# Patient Record
Sex: FEMALE | Race: WHITE | Employment: FULL TIME | URBAN - NONMETROPOLITAN AREA
[De-identification: names, ages, dates, MRNs, and addresses within clinical notes are randomized per-mention and may not be internally consistent; named-entity substitution may affect disease eponyms.]

---

## 2017-12-27 ENCOUNTER — HOSPITAL ENCOUNTER (OUTPATIENT)
Dept: MAMMOGRAPHY | Age: 42
Discharge: HOME | End: 2017-12-27
Attending: OBSTETRICS & GYNECOLOGY | Admitting: OBSTETRICS & GYNECOLOGY

## 2017-12-27 DIAGNOSIS — Z12.31 VISIT FOR SCREENING MAMMOGRAM: ICD-10-CM

## 2019-01-25 ENCOUNTER — HOSPITAL ENCOUNTER (OUTPATIENT)
Dept: WOMENS IMAGING | Age: 44
Discharge: HOME | End: 2019-01-25
Payer: COMMERCIAL

## 2019-01-25 DIAGNOSIS — Z12.31 ENCOUNTER FOR SCREENING MAMMOGRAM FOR BREAST CANCER: ICD-10-CM

## 2019-01-25 PROCEDURE — 77063 BREAST TOMOSYNTHESIS BI: CPT

## 2020-06-29 ENCOUNTER — HOSPITAL ENCOUNTER (OUTPATIENT)
Dept: MAMMOGRAPHY | Age: 45
Discharge: HOME | End: 2020-06-29
Payer: COMMERCIAL

## 2020-06-29 PROCEDURE — 77063 BREAST TOMOSYNTHESIS BI: CPT

## 2020-07-02 ENCOUNTER — HOSPITAL ENCOUNTER (OUTPATIENT)
Dept: MAMMOGRAPHY | Age: 45
Discharge: HOME | End: 2020-07-02
Payer: COMMERCIAL

## 2020-07-02 ENCOUNTER — HOSPITAL ENCOUNTER (OUTPATIENT)
Dept: ULTRASOUND IMAGING | Age: 45
Discharge: HOME | End: 2020-07-02
Payer: COMMERCIAL

## 2020-07-02 PROCEDURE — 77065 DX MAMMO INCL CAD UNI: CPT

## 2021-11-30 ENCOUNTER — OFFICE VISIT (OUTPATIENT)
Dept: OBGYN | Age: 46
End: 2021-11-30
Payer: COMMERCIAL

## 2021-11-30 ENCOUNTER — HOSPITAL ENCOUNTER (OUTPATIENT)
Age: 46
Setting detail: SPECIMEN
Discharge: HOME | End: 2021-11-30
Payer: COMMERCIAL

## 2021-11-30 VITALS
HEIGHT: 66 IN | SYSTOLIC BLOOD PRESSURE: 124 MMHG | BODY MASS INDEX: 32.14 KG/M2 | WEIGHT: 200 LBS | DIASTOLIC BLOOD PRESSURE: 81 MMHG

## 2021-11-30 DIAGNOSIS — Z01.419 WOMEN'S ANNUAL ROUTINE GYNECOLOGICAL EXAMINATION: Primary | ICD-10-CM

## 2021-11-30 PROCEDURE — 99396 PREV VISIT EST AGE 40-64: CPT | Performed by: NURSE PRACTITIONER

## 2021-11-30 PROCEDURE — 88142 CYTOPATH C/V THIN LAYER: CPT

## 2021-11-30 PROCEDURE — 87624 HPV HI-RISK TYP POOLED RSLT: CPT

## 2021-11-30 RX ORDER — LEVOTHYROXINE SODIUM 137 UG/1
137 TABLET ORAL DAILY
COMMUNITY

## 2021-11-30 SDOH — ECONOMIC STABILITY: FOOD INSECURITY: WITHIN THE PAST 12 MONTHS, YOU WORRIED THAT YOUR FOOD WOULD RUN OUT BEFORE YOU GOT MONEY TO BUY MORE.: NEVER TRUE

## 2021-11-30 SDOH — ECONOMIC STABILITY: FOOD INSECURITY: WITHIN THE PAST 12 MONTHS, THE FOOD YOU BOUGHT JUST DIDN'T LAST AND YOU DIDN'T HAVE MONEY TO GET MORE.: NEVER TRUE

## 2021-11-30 ASSESSMENT — SOCIAL DETERMINANTS OF HEALTH (SDOH): HOW HARD IS IT FOR YOU TO PAY FOR THE VERY BASICS LIKE FOOD, HOUSING, MEDICAL CARE, AND HEATING?: NOT HARD AT ALL

## 2021-11-30 ASSESSMENT — ENCOUNTER SYMPTOMS
RESPIRATORY NEGATIVE: 1
GASTROINTESTINAL NEGATIVE: 1

## 2021-11-30 ASSESSMENT — PATIENT HEALTH QUESTIONNAIRE - PHQ9
SUM OF ALL RESPONSES TO PHQ9 QUESTIONS 1 & 2: 0
SUM OF ALL RESPONSES TO PHQ QUESTIONS 1-9: 0
SUM OF ALL RESPONSES TO PHQ QUESTIONS 1-9: 0
2. FEELING DOWN, DEPRESSED OR HOPELESS: 0
1. LITTLE INTEREST OR PLEASURE IN DOING THINGS: 0
SUM OF ALL RESPONSES TO PHQ QUESTIONS 1-9: 0

## 2021-11-30 NOTE — PROGRESS NOTES
21    Catarino Alba  1975    Chief Complaint   Patient presents with    Gynecologic Exam     pt last pap unknown,  last LMP 11/10/21 pt has never had mammo        The patient is a 39 y.o. female, Garrett Kay who presents for her annual exam.  She is menstruating normally. She is  sexually active. She is not currently taking birth control. She reports no gynecological symptoms. Pap smear history: Her last PAP smear was in unknown. Her results were normal.    Breast history: her most recent mammogram was in . The results were: Normal    Past Medical History:   Diagnosis Date    Allergies     Depression     Thyroid disease        Past Surgical History:   Procedure Laterality Date     SECTION         History reviewed. No pertinent family history. Social History     Tobacco Use    Smoking status: Current Every Day Smoker     Types: Cigarettes    Smokeless tobacco: Never Used   Vaping Use    Vaping Use: Never used   Substance Use Topics    Alcohol use: Yes     Alcohol/week: 1.0 standard drink     Types: 1 Glasses of wine per week    Drug use: Not on file       Current Outpatient Medications   Medication Sig Dispense Refill    levothyroxine (SYNTHROID) 137 MCG tablet Take 137 mcg by mouth Daily       No current facility-administered medications for this visit. No Known Allergies        Immunization History   Administered Date(s) Administered    COVID-19, Pfizer, PF, 30mcg/0.3mL 2021, 04/10/2021       Review of Systems   Constitutional: Negative. Respiratory: Negative. Gastrointestinal: Negative. Genitourinary: Negative. /81   Ht 5' 6\" (1.676 m)   Wt 200 lb (90.7 kg)   LMP 11/10/2021   BMI 32.28 kg/m²     Physical Exam  Vitals and nursing note reviewed. Constitutional:       Appearance: Normal appearance. She is obese. HENT:      Head: Normocephalic.    Pulmonary:      Effort: Pulmonary effort is normal.   Chest:   Breasts: Right: Normal.      Left: Normal.       Abdominal:      Palpations: Abdomen is soft. Genitourinary:     General: Normal vulva. Vagina: Normal.      Cervix: Normal.      Uterus: Normal.       Adnexa: Right adnexa normal and left adnexa normal.      Rectum: Normal.   Musculoskeletal:         General: Normal range of motion. Cervical back: Normal range of motion. Skin:     General: Skin is warm and dry. Neurological:      Mental Status: She is alert. Psychiatric:         Attention and Perception: Attention normal.         Mood and Affect: Mood normal.         No results found for this visit on 11/30/21. Assessment and Plan   Diagnosis Orders   1. Women's annual routine gynecological examination  PAP SMEAR    NATE DIGITAL SCREEN W CAD BILATERAL     Pap, mammogram. No concerns/questions at this time. Return in about 1 year (around 11/30/2022).     Titi Maher, ROB - CNP

## 2021-12-04 LAB
HPV HIGH RISK: NOT DETECTED
HPV, GENOTYPE 16: NOT DETECTED
HPV, GENOTYPE 18: NOT DETECTED

## 2022-12-06 ENCOUNTER — OFFICE VISIT (OUTPATIENT)
Dept: OBGYN | Age: 47
End: 2022-12-06
Payer: COMMERCIAL

## 2022-12-06 VITALS
DIASTOLIC BLOOD PRESSURE: 80 MMHG | HEIGHT: 66 IN | WEIGHT: 205 LBS | SYSTOLIC BLOOD PRESSURE: 113 MMHG | BODY MASS INDEX: 32.95 KG/M2

## 2022-12-06 DIAGNOSIS — Z01.419 WOMEN'S ANNUAL ROUTINE GYNECOLOGICAL EXAMINATION: Primary | ICD-10-CM

## 2022-12-06 DIAGNOSIS — Z12.31 BREAST CANCER SCREENING BY MAMMOGRAM: ICD-10-CM

## 2022-12-06 DIAGNOSIS — E03.9 HYPOTHYROIDISM, UNSPECIFIED TYPE: ICD-10-CM

## 2022-12-06 DIAGNOSIS — E66.9 OBESITY, UNSPECIFIED CLASSIFICATION, UNSPECIFIED OBESITY TYPE, UNSPECIFIED WHETHER SERIOUS COMORBIDITY PRESENT: ICD-10-CM

## 2022-12-06 DIAGNOSIS — N92.6 IRREGULAR MENSES: ICD-10-CM

## 2022-12-06 LAB
HCT VFR BLD CALC: 41.4 % (ref 36–48)
HEMOGLOBIN: 13.5 G/DL (ref 12–16)
MCH RBC QN AUTO: 31 PG (ref 26–34)
MCHC RBC AUTO-ENTMCNC: 32.5 G/DL (ref 31–36)
MCV RBC AUTO: 95.2 FL (ref 80–100)
PDW BLD-RTO: 13.4 % (ref 12.4–15.4)
PLATELET # BLD: 344 K/UL (ref 135–450)
PMV BLD AUTO: 8.7 FL (ref 5–10.5)
RBC # BLD: 4.34 M/UL (ref 4–5.2)
TSH SERPL DL<=0.05 MIU/L-ACNC: 4.25 UIU/ML (ref 0.27–4.2)
WBC # BLD: 6.9 K/UL (ref 4–11)

## 2022-12-06 PROCEDURE — 36415 COLL VENOUS BLD VENIPUNCTURE: CPT | Performed by: NURSE PRACTITIONER

## 2022-12-06 PROCEDURE — 99396 PREV VISIT EST AGE 40-64: CPT | Performed by: NURSE PRACTITIONER

## 2022-12-06 SDOH — ECONOMIC STABILITY: FOOD INSECURITY: WITHIN THE PAST 12 MONTHS, YOU WORRIED THAT YOUR FOOD WOULD RUN OUT BEFORE YOU GOT MONEY TO BUY MORE.: NEVER TRUE

## 2022-12-06 SDOH — ECONOMIC STABILITY: FOOD INSECURITY: WITHIN THE PAST 12 MONTHS, THE FOOD YOU BOUGHT JUST DIDN'T LAST AND YOU DIDN'T HAVE MONEY TO GET MORE.: NEVER TRUE

## 2022-12-06 SDOH — ECONOMIC STABILITY: TRANSPORTATION INSECURITY
IN THE PAST 12 MONTHS, HAS LACK OF TRANSPORTATION KEPT YOU FROM MEETINGS, WORK, OR FROM GETTING THINGS NEEDED FOR DAILY LIVING?: NO

## 2022-12-06 SDOH — ECONOMIC STABILITY: TRANSPORTATION INSECURITY
IN THE PAST 12 MONTHS, HAS THE LACK OF TRANSPORTATION KEPT YOU FROM MEDICAL APPOINTMENTS OR FROM GETTING MEDICATIONS?: NO

## 2022-12-06 ASSESSMENT — PATIENT HEALTH QUESTIONNAIRE - PHQ9
2. FEELING DOWN, DEPRESSED OR HOPELESS: 0
SUM OF ALL RESPONSES TO PHQ9 QUESTIONS 1 & 2: 0
1. LITTLE INTEREST OR PLEASURE IN DOING THINGS: 0
SUM OF ALL RESPONSES TO PHQ QUESTIONS 1-9: 0

## 2022-12-06 ASSESSMENT — ENCOUNTER SYMPTOMS
DIARRHEA: 0
NAUSEA: 0
SHORTNESS OF BREATH: 0
GASTROINTESTINAL NEGATIVE: 1
CONSTIPATION: 0
RESPIRATORY NEGATIVE: 1
VOMITING: 0
ABDOMINAL PAIN: 0

## 2022-12-06 ASSESSMENT — SOCIAL DETERMINANTS OF HEALTH (SDOH): HOW HARD IS IT FOR YOU TO PAY FOR THE VERY BASICS LIKE FOOD, HOUSING, MEDICAL CARE, AND HEATING?: NOT HARD AT ALL

## 2022-12-06 NOTE — PROGRESS NOTES
30mcg/0.3mL 03/20/2021, 04/10/2021       Review of Systems   Constitutional: Negative. Negative for fatigue. Respiratory: Negative. Negative for shortness of breath. Gastrointestinal: Negative. Negative for abdominal pain, constipation, diarrhea, nausea and vomiting. Genitourinary: Negative. Neurological:  Negative for dizziness. Psychiatric/Behavioral: Negative. /80   Ht 5' 6\" (1.676 m)   Wt 205 lb (93 kg)   BMI 33.09 kg/m²     Physical Exam  Vitals and nursing note reviewed. Constitutional:       Appearance: Normal appearance. She is obese. HENT:      Head: Normocephalic. Pulmonary:      Effort: Pulmonary effort is normal. No respiratory distress. Chest:   Breasts:     Right: Normal.      Left: Normal.   Abdominal:      Palpations: Abdomen is soft. Tenderness: There is no abdominal tenderness. Genitourinary:     General: Normal vulva. Exam position: Lithotomy position. Vagina: Normal.      Cervix: Normal.      Uterus: Normal.       Adnexa: Right adnexa normal and left adnexa normal.      Rectum: Normal.   Musculoskeletal:         General: Normal range of motion. Cervical back: Normal and normal range of motion. Lumbar back: Normal.   Lymphadenopathy:      Cervical: No cervical adenopathy. Upper Body:      Right upper body: No supraclavicular or axillary adenopathy. Left upper body: No supraclavicular or axillary adenopathy. Lower Body: No right inguinal adenopathy. No left inguinal adenopathy. Skin:     General: Skin is warm and dry. Neurological:      General: No focal deficit present. Mental Status: She is alert and oriented to person, place, and time. Psychiatric:         Attention and Perception: Attention normal.         Mood and Affect: Mood normal.         Speech: Speech normal.         Behavior: Behavior is cooperative.          Cognition and Memory: Cognition normal.         Judgment: Judgment normal.       No results found for this visit on 12/06/22. Assessment and Plan   Diagnosis Orders   1. Women's annual routine gynecological examination        2. Irregular menses  CBC    TSH    US NON OB TRANSVAGINAL      3. Hypothyroidism, unspecified type  TSH      4. Obesity, unspecified classification, unspecified obesity type, unspecified whether serious comorbidity present        5. Breast cancer screening by mammogram  NATE LIZBET DIGITAL SCREEN BILATERAL PER PROTOCOL        U/s and labs with f/u  Pt taking Synthroid 137mcg QD    Pt st's no improvement in cycle regulation with use of OCPs in the past, pt desires Novasure. Return in about 1 week (around 12/13/2022).     Lei Rodriguez, APRN - CNP

## 2022-12-22 ENCOUNTER — OFFICE VISIT (OUTPATIENT)
Dept: OBGYN | Age: 47
End: 2022-12-22
Payer: COMMERCIAL

## 2022-12-22 VITALS
SYSTOLIC BLOOD PRESSURE: 119 MMHG | BODY MASS INDEX: 32.47 KG/M2 | DIASTOLIC BLOOD PRESSURE: 82 MMHG | WEIGHT: 202 LBS | HEIGHT: 66 IN

## 2022-12-22 DIAGNOSIS — E03.9 HYPOTHYROIDISM, UNSPECIFIED TYPE: ICD-10-CM

## 2022-12-22 DIAGNOSIS — N92.1 METRORRHAGIA: Primary | ICD-10-CM

## 2022-12-22 LAB
FOLLICLE STIMULATING HORMONE: 8.8 MIU/ML
PROLACTIN: 12.6 NG/ML

## 2022-12-22 PROCEDURE — 99204 OFFICE O/P NEW MOD 45 MIN: CPT | Performed by: OBSTETRICS & GYNECOLOGY

## 2022-12-22 RX ORDER — LEVOTHYROXINE SODIUM 0.15 MG/1
150 TABLET ORAL DAILY
Qty: 30 TABLET | Refills: 5 | Status: SHIPPED | OUTPATIENT
Start: 2022-12-22

## 2022-12-22 NOTE — PROGRESS NOTES
22    Deborah Dent  1975    Chief Complaint   Patient presents with    Follow-up     Pt here to discuss us results, hx of irregular menses, LMP 22. C/o cramping. Deborah Dent is a 41 y.o. female who presents today for evaluation of AUB. Menses are generally every 21 days and lasts 4 days, recently menses q 14 days and lasts 4 days. LMP  and no bleeding since. Past Medical History:   Diagnosis Date    Allergies     Depression     Irregular menses     Thyroid disease        Past Surgical History:   Procedure Laterality Date     SECTION         Social History     Tobacco Use    Smoking status: Former     Packs/day: 0.25     Years: 15.00     Pack years: 3.75     Types: Cigarettes     Quit date: 2022     Years since quittin.4    Smokeless tobacco: Never   Vaping Use    Vaping Use: Never used   Substance Use Topics    Alcohol use: Yes     Alcohol/week: 1.0 standard drink     Types: 1 Glasses of wine per week     Comment: socially    Drug use: Never       No family history on file. Current Outpatient Medications   Medication Sig Dispense Refill    levothyroxine (SYNTHROID) 150 MCG tablet Take 1 tablet by mouth Daily 30 tablet 5     No current facility-administered medications for this visit.        No Known Allergies        Immunization History   Administered Date(s) Administered    COVID-19, PFIZER PURPLE top, DILUTE for use, (age 15 y+), 30mcg/0.3mL 2021, 04/10/2021       Review of Systems    /82 (Site: Right Upper Arm, Position: Sitting, Cuff Size: Large Adult)   Ht 5' 6\" (1.676 m)   Wt 202 lb (91.6 kg)   LMP 2022   BMI 32.60 kg/m²     Physical Exam  2022 0907 2022 2158 TSH [[id number]]   (Abnormal)   Blood    Component Value Units   TSH 4.25 High  uIU/mL          2022 0907 2022 CBC [[id number]]   Blood    Component Value Units   WBC 6.9 K/uL   RBC 4.34 M/uL   Hemoglobin 13.5 g/dL   Hematocrit 41.4 %   MCV 95.2 fL   MCH 31.0 pg   MCHC 32.5 g/dL   RDW 13.4 %   Platelets 009 K/uL   MPV 8.7 fL            See us report  No results found for this visit on 12/22/22. ASSESSMENT AND PLAN   Diagnosis Orders   1. Metrorrhagia  Follicle Stimulating Hormone    Prolactin    Testosterone, free, total    TSH with Reflex to FT4      2. Hypothyroidism, unspecified type  Follicle Stimulating Hormone    Prolactin    Testosterone, free, total    TSH with Reflex to FT4    levothyroxine (SYNTHROID) 150 MCG tablet        Discussed every 4 week menstrual periods in September and October and now no menstrual period since November 16. This appears to be a combination of a recent COVID diagnosis in September as well as borderline hypothyroidism at this point. Will increase the dose of levothyroxine from 137 mcg to 150 mcg. We will have patient repeat a TSH in 6 to 8 weeks. We will monitor her menstrual period until follow-up appointment in 6 to 8 weeks and then determine any further work-up and/or treatment. Return in about 6 weeks (around 2/2/2023).     Ita Lopez MD

## 2023-02-15 DIAGNOSIS — N92.1 METRORRHAGIA: ICD-10-CM

## 2023-02-15 DIAGNOSIS — E03.9 HYPOTHYROIDISM, UNSPECIFIED TYPE: ICD-10-CM

## 2023-02-15 PROCEDURE — 36415 COLL VENOUS BLD VENIPUNCTURE: CPT | Performed by: OBSTETRICS & GYNECOLOGY

## 2023-02-16 ENCOUNTER — OFFICE VISIT (OUTPATIENT)
Dept: OBGYN | Age: 48
End: 2023-02-16
Payer: COMMERCIAL

## 2023-02-16 VITALS
BODY MASS INDEX: 32.95 KG/M2 | SYSTOLIC BLOOD PRESSURE: 133 MMHG | WEIGHT: 205 LBS | DIASTOLIC BLOOD PRESSURE: 80 MMHG | HEIGHT: 66 IN

## 2023-02-16 DIAGNOSIS — N92.1 MENOMETRORRHAGIA: Primary | ICD-10-CM

## 2023-02-16 PROCEDURE — 99213 OFFICE O/P EST LOW 20 MIN: CPT | Performed by: OBSTETRICS & GYNECOLOGY

## 2023-02-16 SDOH — ECONOMIC STABILITY: HOUSING INSECURITY
IN THE LAST 12 MONTHS, WAS THERE A TIME WHEN YOU DID NOT HAVE A STEADY PLACE TO SLEEP OR SLEPT IN A SHELTER (INCLUDING NOW)?: NO

## 2023-02-16 SDOH — ECONOMIC STABILITY: FOOD INSECURITY: WITHIN THE PAST 12 MONTHS, YOU WORRIED THAT YOUR FOOD WOULD RUN OUT BEFORE YOU GOT MONEY TO BUY MORE.: NEVER TRUE

## 2023-02-16 SDOH — ECONOMIC STABILITY: FOOD INSECURITY: WITHIN THE PAST 12 MONTHS, THE FOOD YOU BOUGHT JUST DIDN'T LAST AND YOU DIDN'T HAVE MONEY TO GET MORE.: NEVER TRUE

## 2023-02-16 SDOH — ECONOMIC STABILITY: INCOME INSECURITY: HOW HARD IS IT FOR YOU TO PAY FOR THE VERY BASICS LIKE FOOD, HOUSING, MEDICAL CARE, AND HEATING?: NOT HARD AT ALL

## 2023-02-16 ASSESSMENT — PATIENT HEALTH QUESTIONNAIRE - PHQ9
SUM OF ALL RESPONSES TO PHQ QUESTIONS 1-9: 0
SUM OF ALL RESPONSES TO PHQ QUESTIONS 1-9: 0
SUM OF ALL RESPONSES TO PHQ9 QUESTIONS 1 & 2: 0
1. LITTLE INTEREST OR PLEASURE IN DOING THINGS: 0
SUM OF ALL RESPONSES TO PHQ QUESTIONS 1-9: 0
SUM OF ALL RESPONSES TO PHQ QUESTIONS 1-9: 0
2. FEELING DOWN, DEPRESSED OR HOPELESS: 0

## 2023-02-16 NOTE — PROGRESS NOTES
Per [employer], no prior auth required for [id number] done in office  Ready to schedule

## 2023-02-16 NOTE — PROGRESS NOTES
23    Adonis Serrato  1975    Chief Complaint   Patient presents with    Follow-up     Pt here to f/u on ultrasound and labs d/t irregular menses. Pt states cycles are starting to get regular but heavy in flow. Adonis Serrato is a 41 y.o. female who presents today for evaluation of AUB. Menses were quite irregular and heavy. Now they are more regular but are getting heavier. Bright red, heavy, + clots. Moderate cramps. Past Medical History:   Diagnosis Date    Allergies     Depression     Irregular menses     Thyroid disease        Past Surgical History:   Procedure Laterality Date     SECTION         Social History     Tobacco Use    Smoking status: Former     Packs/day: 0.25     Years: 15.00     Pack years: 3.75     Types: Cigarettes     Quit date: 2022     Years since quittin.6    Smokeless tobacco: Never   Vaping Use    Vaping Use: Never used   Substance Use Topics    Alcohol use: Yes     Alcohol/week: 1.0 standard drink     Types: 1 Glasses of wine per week     Comment: socially    Drug use: Never       No family history on file. Current Outpatient Medications   Medication Sig Dispense Refill    levothyroxine (SYNTHROID) 150 MCG tablet Take 1 tablet by mouth Daily 30 tablet 5     No current facility-administered medications for this visit.        No Known Allergies        Immunization History   Administered Date(s) Administered    COVID-19, PFIZER PURPLE top, DILUTE for use, (age 15 y+), 30mcg/0.3mL 2021, 04/10/2021       Review of Systems    /80 (Site: Left Upper Arm, Position: Sitting, Cuff Size: Large Adult)   Ht 5' 6\" (1.676 m)   Wt 205 lb (93 kg)   BMI 33.09 kg/m²     Physical Exam    No results found for this visit on 23.  2023 (Resulted time) 02/15/2023 0800 TSH with Reflex to FT4 [[id number]]   Blood    Component Value   No component results          2022 0846 2022 1602 Testosterone, free, total [[id number]] (Abnormal)   Blood    Component Value Units   Testosterone <3 Low  ng/dL   Sex Hormone Binding 37  nmol/L   Testosterone, Free SEE BELOW  pg/mL          12/22/2022 0846 12/22/2022 2309 Prolactin [[id number]]   Blood    Component Value Units   Prolactin 12.6  ng/mL          12/22/2022 0846 87/65/6396 2696 Follicle Stimulating Hormone [[id number]]   Blood    Component Value Units   FSH 8.8  mIU/mL          12/06/2022 0907 12/06/2022 2158 TSH [[id number]]   (Abnormal)   Blood    Component Value Units   TSH 4.25 High  uIU/mL          12/06/2022 0907 12/06/2022 2028 CBC [[id number]]   Blood    Component Value Units   WBC 6.9 K/uL   RBC 4.34 M/uL   Hemoglobin 13.5 g/dL   Hematocrit 41.4 %   MCV 95.2 fL   MCH 31.0 pg   MCHC 32.5 g/dL   RDW 13.4 %   Platelets 385 K/uL   MPV 8.7 fL          11/30/2021 1640 12/04/2021 1659 Human papillomavirus (HPV) DNA probe cervical brush high risk [[id number]]   Cervix    Component Value   HPV High Risk NOT DETECTED   HPV, Genotype 16 NOT DETECTED   HPV, Genotype 18 NOT DETECTED           See ultrasound report    ASSESSMENT AND PLAN   Diagnosis Orders   1. Menometrorrhagia        Discussed options of hormonal management, lysteda, hysteroscopy and D&C. ALso discussed hysterectomy and endometrial ablation. Will schedule hysteroscopy and D&C    Return in about 4 weeks (around 3/16/2023).     Heena Parker MD

## 2023-03-06 DIAGNOSIS — Z12.31 BREAST CANCER SCREENING BY MAMMOGRAM: ICD-10-CM

## 2023-03-07 DIAGNOSIS — R92.2 BREAST DENSITY: Primary | ICD-10-CM

## 2023-03-17 ENCOUNTER — PROCEDURE VISIT (OUTPATIENT)
Dept: OBGYN | Age: 48
End: 2023-03-17

## 2023-03-17 ENCOUNTER — HOSPITAL ENCOUNTER (OUTPATIENT)
Age: 48
Setting detail: SPECIMEN
Discharge: HOME | End: 2023-03-17

## 2023-03-17 VITALS
HEART RATE: 95 BPM | HEIGHT: 66 IN | WEIGHT: 202 LBS | SYSTOLIC BLOOD PRESSURE: 131 MMHG | BODY MASS INDEX: 32.47 KG/M2 | DIASTOLIC BLOOD PRESSURE: 84 MMHG

## 2023-03-17 DIAGNOSIS — N92.1 MENOMETRORRHAGIA: Primary | ICD-10-CM

## 2023-03-17 LAB
CONTROL: NORMAL
PREGNANCY TEST URINE, POC: NORMAL

## 2023-03-17 RX ORDER — KETOROLAC TROMETHAMINE 30 MG/ML
30 INJECTION, SOLUTION INTRAMUSCULAR; INTRAVENOUS ONCE
Status: COMPLETED | OUTPATIENT
Start: 2023-03-17 | End: 2023-03-17

## 2023-03-17 RX ADMIN — KETOROLAC TROMETHAMINE 30 MG: 30 INJECTION, SOLUTION INTRAMUSCULAR; INTRAVENOUS at 09:16

## 2023-03-17 NOTE — PROGRESS NOTES
DATE OF PROCEDURE:                      3/17/2023     SURGEON:                                             Nader Austin MD     PREOPERATIVE DIAGNOSIS:              Metrorrhagia     POSTOPERATIVE DIAGNOSIS:            Same     OPERATION:                                         Hysteroscopy and D&C. ANESTHESIA:                                        pericervical block, toradol 30mg IM     ESTIMATED BLOOD LOSS:                 Minimal.      COMPLICATIONS:                                 None. SPECIMEN:                                             Endometrial curettings     INDICATION:                                          Menometrorrhagia 53-year-old female patient     FINDINGS:                                               Normal ectocervix. Hysteroscopy reveals a normal endocervical canal.  Hysteroscopy reveals a normal endometrial cavity. There is no evidence of a submucosal myoma and there is no evidence of endometrial polyp. PROCEDURE NOTE: With the patient in the dorsal lithotomy position. The perineum was scrubbed and draped in the usual fashion. A speculum was placed in the vagina. The anterior lip of the cervix was grabbed by single-tooth tenaculum. The cervical os was dilated with Jack dilators. A diagnostic hysteroscope was introduced into the uterine cavity, and using normal saline as distending medium, diagnostic hysteroscopy was carried out. The hysteroscope was then withdrawn. Next, sharp curettage of the endometrium was performed, and all tissues were submitted to pathology. The single-tooth tenaculum was removed from the patient's cervix. Inspection revealed excellent hemostasis. The procedure was then terminated. All instruments were removed. Patient tolerated the procedure well. Postoperative instructions were given.

## 2023-04-25 ENCOUNTER — OFFICE VISIT (OUTPATIENT)
Dept: OBGYN | Age: 48
End: 2023-04-25
Payer: COMMERCIAL

## 2023-04-25 VITALS
HEIGHT: 66 IN | BODY MASS INDEX: 32.78 KG/M2 | SYSTOLIC BLOOD PRESSURE: 107 MMHG | DIASTOLIC BLOOD PRESSURE: 78 MMHG | WEIGHT: 204 LBS

## 2023-04-25 DIAGNOSIS — N92.1 MENOMETRORRHAGIA: Primary | ICD-10-CM

## 2023-04-25 PROCEDURE — 99213 OFFICE O/P EST LOW 20 MIN: CPT | Performed by: OBSTETRICS & GYNECOLOGY

## 2023-04-25 NOTE — PROGRESS NOTES
41.4 %   MCV 95.2 fL   MCH 31.0 pg   MCHC 32.5 g/dL   RDW 13.4 %   Platelets 085 K/uL   MPV 8.7 fL          03/17/2023 0919 03/21/2023 1314 Surgical Pathology [[id number]]  Component Value   No component results          03/17/2023 0913 03/17/2023 0913 POCT urine pregnancy [[id number]] Component Value   Preg Test, Ur neg   Control --          02/14/2023 (Resulted time) 02/15/2023 0800 TSH with Reflex to FT4 [[id number]]   Blood    Component Value   No component results          12/22/2022 0846 12/28/2022 1602 Testosterone, free, total [[id number]]   (Abnormal)   Blood    Component Value Units   Testosterone <3 Low  ng/dL   Sex Hormone Binding 37  nmol/L   Testosterone, Free SEE BELOW  pg/mL          12/22/2022 0846 12/22/2022 2309 Prolactin [[id number]]   Blood    Component Value Units   Prolactin 12.6  ng/mL          12/22/2022 0846 09/87/7839 9658 Follicle Stimulating Hormone [[id number]]   Blood    Component Value Units   FSH 8.8  mIU/mL          12/06/2022 0907 12/06/2022 2158 TSH [[id number]]   (Abnormal)   Blood    Component Value Units   TSH 4.25 High  uIU/mL              Repeat thyroid 2/23 normal    Final Pathologic Diagnosis:   Endometrium, curettage:   -  Secretory phase endometrium.     -  Negative for hyperplasia or malignancy    No results found for this visit on 04/25/23. ASSESSMENT AND PLAN   Diagnosis Orders   1. Menometrorrhagia          Consider endometrial ablation if aub continues    Return if symptoms worsen or fail to improve.     Olimpia Holden MD

## 2023-08-15 DIAGNOSIS — Z09 FOLLOW-UP EXAM, 3-6 MONTHS SINCE PREVIOUS EXAM: Primary | ICD-10-CM

## 2023-08-23 DIAGNOSIS — E03.9 HYPOTHYROIDISM, UNSPECIFIED TYPE: ICD-10-CM

## 2023-08-24 RX ORDER — LEVOTHYROXINE SODIUM 0.15 MG/1
150 TABLET ORAL DAILY
Qty: 30 TABLET | Refills: 5 | Status: SHIPPED | OUTPATIENT
Start: 2023-08-24

## 2023-08-29 DIAGNOSIS — Z09 FOLLOW-UP EXAM, 3-6 MONTHS SINCE PREVIOUS EXAM: ICD-10-CM

## 2024-03-07 DIAGNOSIS — R92.30 BREAST DENSITY: ICD-10-CM

## 2024-09-30 SDOH — ECONOMIC STABILITY: INCOME INSECURITY: HOW HARD IS IT FOR YOU TO PAY FOR THE VERY BASICS LIKE FOOD, HOUSING, MEDICAL CARE, AND HEATING?: NOT VERY HARD

## 2024-09-30 SDOH — ECONOMIC STABILITY: FOOD INSECURITY: WITHIN THE PAST 12 MONTHS, YOU WORRIED THAT YOUR FOOD WOULD RUN OUT BEFORE YOU GOT MONEY TO BUY MORE.: NEVER TRUE

## 2024-09-30 SDOH — ECONOMIC STABILITY: FOOD INSECURITY: WITHIN THE PAST 12 MONTHS, THE FOOD YOU BOUGHT JUST DIDN'T LAST AND YOU DIDN'T HAVE MONEY TO GET MORE.: NEVER TRUE

## 2024-10-01 ENCOUNTER — HOSPITAL ENCOUNTER (OUTPATIENT)
Age: 49
Setting detail: SPECIMEN
Discharge: HOME | End: 2024-10-01
Payer: COMMERCIAL

## 2024-10-01 ENCOUNTER — OFFICE VISIT (OUTPATIENT)
Dept: OBGYN | Age: 49
End: 2024-10-01
Payer: COMMERCIAL

## 2024-10-01 VITALS
BODY MASS INDEX: 30.22 KG/M2 | WEIGHT: 188 LBS | HEIGHT: 66 IN | SYSTOLIC BLOOD PRESSURE: 127 MMHG | HEART RATE: 89 BPM | DIASTOLIC BLOOD PRESSURE: 84 MMHG

## 2024-10-01 DIAGNOSIS — N95.1 MENOPAUSAL SYMPTOM: ICD-10-CM

## 2024-10-01 DIAGNOSIS — N95.2 ATROPHIC VAGINITIS: ICD-10-CM

## 2024-10-01 DIAGNOSIS — Z11.51 ENCOUNTER FOR SCREENING FOR HUMAN PAPILLOMAVIRUS (HPV): ICD-10-CM

## 2024-10-01 DIAGNOSIS — Z12.31 SCREENING MAMMOGRAM FOR BREAST CANCER: ICD-10-CM

## 2024-10-01 DIAGNOSIS — Z01.419 ENCOUNTER FOR ANNUAL ROUTINE GYNECOLOGICAL EXAMINATION: Primary | ICD-10-CM

## 2024-10-01 PROCEDURE — 99396 PREV VISIT EST AGE 40-64: CPT | Performed by: OBSTETRICS & GYNECOLOGY

## 2024-10-01 PROCEDURE — 36415 COLL VENOUS BLD VENIPUNCTURE: CPT | Performed by: OBSTETRICS & GYNECOLOGY

## 2024-10-01 PROCEDURE — G0123 SCREEN CERV/VAG THIN LAYER: HCPCS

## 2024-10-01 RX ORDER — ESTRADIOL 0.1 MG/G
1 CREAM VAGINAL
Qty: 3 EACH | Refills: 3 | Status: SHIPPED | OUTPATIENT
Start: 2024-10-02

## 2024-10-01 ASSESSMENT — PATIENT HEALTH QUESTIONNAIRE - PHQ9
SUM OF ALL RESPONSES TO PHQ QUESTIONS 1-9: 0
2. FEELING DOWN, DEPRESSED OR HOPELESS: NOT AT ALL
SUM OF ALL RESPONSES TO PHQ QUESTIONS 1-9: 0
SUM OF ALL RESPONSES TO PHQ QUESTIONS 1-9: 0
SUM OF ALL RESPONSES TO PHQ9 QUESTIONS 1 & 2: 0
1. LITTLE INTEREST OR PLEASURE IN DOING THINGS: NOT AT ALL
SUM OF ALL RESPONSES TO PHQ QUESTIONS 1-9: 0

## 2024-10-01 NOTE — PROGRESS NOTES
10/1/24    Meredith Winslow  1975    Chief Complaint   Patient presents with    Gynecologic Exam     Pt here for annual, is sexually active, regular menses, LMP-2024, bc-tubal ligation, pap-2021-neg, mammo-3/6/2024-normal.     Other     Pt c/o vaginal dryness and hot flashes, moth dryness x 6 months.         Meredith Winslow is a 48 y.o. female who presents today for evaluation of annual as above    Past Medical History:   Diagnosis Date    Allergies     Depression     Hot flashes     Irregular menses     Menometrorrhagia     Thyroid disease     Vaginal dryness        Past Surgical History:   Procedure Laterality Date     SECTION      COLONOSCOPY          DILATION AND CURETTAGE      TUBAL LIGATION         Social History     Tobacco Use    Smoking status: Former     Current packs/day: 0.00     Average packs/day: 0.3 packs/day for 15.0 years (3.8 ttl pk-yrs)     Types: Cigarettes     Start date: 2007     Quit date: 2022     Years since quittin.2    Smokeless tobacco: Never   Vaping Use    Vaping status: Never Used   Substance Use Topics    Alcohol use: Not Currently     Alcohol/week: 1.0 standard drink of alcohol     Types: 1 Glasses of wine per week     Comment: socially    Drug use: Never       No family history on file.    Current Outpatient Medications   Medication Sig Dispense Refill    Fluticasone Propionate (FLONASE NA) by Nasal route      estradiol-levonorgestrel (CLIMARAPRO) 0.045-0.015 MG/DAY Place 1 patch onto the skin once a week 4 patch 3    [START ON 10/2/2024] estradiol (ESTRACE VAGINAL) 0.1 MG/GM vaginal cream Place 1 g vaginally three times a week Use 1 g vaginally nightly for 2 weeks, then 1 g nightly 2-3 times per week. 3 each 3    levothyroxine (SYNTHROID) 150 MCG tablet TAKE 1 TABLET BY MOUTH DAILY 30 tablet 5     No current facility-administered medications for this visit.       No Known Allergies        Immunization History   Administered Date(s)

## 2024-10-02 LAB — FSH SERPL-ACNC: 3.9 MIU/ML

## 2024-10-03 LAB
COMMENT: NORMAL
HPV OTHER HR TYPES: NOT DETECTED
HPV TYPE 16: NOT DETECTED
HPV TYPE 18: NOT DETECTED

## 2024-10-05 LAB
ESTRADIOL SERPL HS-MCNC: 94 PG/ML
ESTROGEN SERPL CALC-MCNC: 144.1 PG/ML
ESTRONE SERPL-MCNC: 50.1 PG/ML

## 2024-10-09 LAB — GYNECOLOGY CYTOLOGY REPORT: NORMAL

## 2024-12-02 ENCOUNTER — OFFICE VISIT (OUTPATIENT)
Dept: OBGYN | Age: 49
End: 2024-12-02
Payer: COMMERCIAL

## 2024-12-02 VITALS
DIASTOLIC BLOOD PRESSURE: 75 MMHG | WEIGHT: 186 LBS | BODY MASS INDEX: 29.89 KG/M2 | HEIGHT: 66 IN | SYSTOLIC BLOOD PRESSURE: 104 MMHG

## 2024-12-02 DIAGNOSIS — N95.1 MENOPAUSAL SYMPTOM: ICD-10-CM

## 2024-12-02 DIAGNOSIS — N95.2 ATROPHIC VAGINITIS: Primary | ICD-10-CM

## 2024-12-02 PROCEDURE — 99213 OFFICE O/P EST LOW 20 MIN: CPT | Performed by: OBSTETRICS & GYNECOLOGY

## 2024-12-02 RX ORDER — ESTRADIOL 0.1 MG/G
1 CREAM VAGINAL SEE ADMIN INSTRUCTIONS
Qty: 3 EACH | Refills: 3 | Status: SHIPPED | OUTPATIENT
Start: 2024-12-02 | End: 2024-12-06 | Stop reason: SDUPTHER

## 2024-12-02 NOTE — PROGRESS NOTES
12/2/24    Meredith Winslow  1975    Chief Complaint   Patient presents with    Other     Pt was seen In October due to menopausal sx. Pt states the pharmacist was questioning patient on being on CLIMARAPRO  and estadiol therefore patient has only been  using CLIMARAPRO patch. Pt states has noticed a slight improvement with hot flashes.         Meredith Winslow is a 48 y.o. female who presents today for evaluation of menopausal symptoms.  Hot flashes resolved.  Still having vaginal dryness  Collected Updated Procedure    10/01/2024 1205 10/05/2024 0747 Estrogens, Fractionated [[id number]]   Blood    Component Value Units   Estradiol 94.0  pg/mL   Estrone 50.1  pg/mL   Estrogen Total 144.1  pg/mL          10/01/2024 1205 10/02/2024 0002 Follicle Stimulating Hormone [[id number]]   Blood    Component Value Units   FSH 3.9  mIU/mL          10/01/2024 1148 10/03/2024 2339 HPV HIGH RISK DNA WITH HPV GENOTYPES 16 AND 18 [[id number]] Component Value   HPV Genotype 16 Not Detected   HPV Type 18 Not Detected   HPV other hr types Not Detected    Comment See below           10/01/2024 1148 10/09/2024 1115 GYN Cytology [[id number]] Component Value   Gynecology Cytology Report Path Number: [id number]    DIAGNOSIS    Source of Specimen: Cervix    Adequacy:  Satisfactory for evaluation.       - Endocervical/transformation zone component present.    Interpritation:  Negative for intraepithelial lesion or malignancy.  Shift in jasvir suggestive of bacterial vaginosis.      Cytotech Screener:  CORINE     **Electronically Signed Out**  BRITTANY Pearson(ASCP),IAC,CMIAC  /10/9/2024    Procedure/Addendum  HPV Procedure Report       Date Ordered:     10/2/2024     Status: Signed Out       Date Complete:     10/3/2024     By: System Interface       Date Reported:     10/3/2024             Sample:  HPV 16      Result:   Not Detected      Ref Range: Not  Detected  Sample:  HPV 18      Result:   Not Detected      Ref Range: Not  Detected  Sample:

## 2024-12-06 DIAGNOSIS — N95.1 MENOPAUSAL SYMPTOM: ICD-10-CM

## 2024-12-06 DIAGNOSIS — N95.2 ATROPHIC VAGINITIS: ICD-10-CM

## 2024-12-06 RX ORDER — ESTRADIOL 0.1 MG/G
1 CREAM VAGINAL SEE ADMIN INSTRUCTIONS
Qty: 3 EACH | Refills: 3 | Status: SHIPPED | OUTPATIENT
Start: 2024-12-06

## 2024-12-06 NOTE — TELEPHONE ENCOUNTER
Pt states her Climara Pro patch is $200 at [facility]. Pt states it is cheaper through mail order. Pt requesting script to [facility]. Pt is wanting to have her Estradiol sent to that pharmacy as well for convenience. Please advise.

## 2025-02-21 DIAGNOSIS — N95.1 MENOPAUSAL SYMPTOM: ICD-10-CM

## 2025-02-21 RX ORDER — ESTRADIOL AND LEVONORGESTREL .045; .015 MG/D; MG/D
1 PATCH TRANSDERMAL WEEKLY
Qty: 4 PATCH | Refills: 2 | OUTPATIENT
Start: 2025-02-21

## 2025-03-05 DIAGNOSIS — N95.1 MENOPAUSAL SYMPTOM: ICD-10-CM

## 2025-03-05 NOTE — TELEPHONE ENCOUNTER
Please adjust pend medication, needs 90 day supply through mail order until annual due in October 2025

## 2025-06-08 DIAGNOSIS — N95.1 MENOPAUSAL SYMPTOM: ICD-10-CM

## 2025-06-09 RX ORDER — ESTRADIOL AND LEVONORGESTREL .045; .015 MG/D; MG/D
1 PATCH TRANSDERMAL WEEKLY
Qty: 4 PATCH | Refills: 2 | OUTPATIENT
Start: 2025-06-09

## 2025-06-25 DIAGNOSIS — N95.2 ATROPHIC VAGINITIS: ICD-10-CM

## 2025-06-25 RX ORDER — ESTRADIOL 0.1 MG/G
1 CREAM VAGINAL SEE ADMIN INSTRUCTIONS
Qty: 3 EACH | Refills: 2 | Status: SHIPPED | OUTPATIENT
Start: 2025-06-25

## 2025-06-26 DIAGNOSIS — N95.1 MENOPAUSAL SYMPTOM: ICD-10-CM
